# Patient Record
Sex: MALE | Race: WHITE | URBAN - METROPOLITAN AREA
[De-identification: names, ages, dates, MRNs, and addresses within clinical notes are randomized per-mention and may not be internally consistent; named-entity substitution may affect disease eponyms.]

---

## 2017-11-09 ENCOUNTER — APPOINTMENT (RX ONLY)
Dept: URBAN - METROPOLITAN AREA CLINIC 116 | Facility: CLINIC | Age: 57
Setting detail: DERMATOLOGY
End: 2017-11-09

## 2017-11-09 DIAGNOSIS — L73.9 FOLLICULAR DISORDER, UNSPECIFIED: ICD-10-CM

## 2017-11-09 DIAGNOSIS — L91.8 OTHER HYPERTROPHIC DISORDERS OF THE SKIN: ICD-10-CM

## 2017-11-09 DIAGNOSIS — L663 OTHER SPECIFIED DISEASES OF HAIR AND HAIR FOLLICLES: ICD-10-CM

## 2017-11-09 DIAGNOSIS — L738 OTHER SPECIFIED DISEASES OF HAIR AND HAIR FOLLICLES: ICD-10-CM

## 2017-11-09 PROBLEM — L02.222 FURUNCLE OF BACK [ANY PART, EXCEPT BUTTOCK]: Status: ACTIVE | Noted: 2017-11-09

## 2017-11-09 PROCEDURE — ? REFUSAL OF TREATMENT

## 2017-11-09 PROCEDURE — 11201 RMVL SKIN TAGS EA ADDL 10: CPT

## 2017-11-09 PROCEDURE — 11200 RMVL SKIN TAGS UP TO&INC 15: CPT

## 2017-11-09 PROCEDURE — ? COUNSELING

## 2017-11-09 PROCEDURE — 99214 OFFICE O/P EST MOD 30 MIN: CPT | Mod: 25

## 2017-11-09 PROCEDURE — ? SKIN TAG REMOVAL

## 2017-11-09 ASSESSMENT — LOCATION SIMPLE DESCRIPTION DERM
LOCATION SIMPLE: LEFT AXILLARY VAULT
LOCATION SIMPLE: RIGHT UPPER BACK
LOCATION SIMPLE: RIGHT AXILLARY VAULT

## 2017-11-09 ASSESSMENT — LOCATION ZONE DERM
LOCATION ZONE: TRUNK
LOCATION ZONE: AXILLAE

## 2017-11-09 ASSESSMENT — LOCATION DETAILED DESCRIPTION DERM
LOCATION DETAILED: RIGHT AXILLARY VAULT
LOCATION DETAILED: RIGHT MID-UPPER BACK
LOCATION DETAILED: LEFT AXILLARY VAULT

## 2017-11-09 NOTE — PROCEDURE: SKIN TAG REMOVAL
Detail Level: Detailed
Hemostasis: Aluminum Chloride
Consent: Written consent obtained and the risks of skin tag removal was reviewed with the patient including but not limited to bleeding, pigmentary change, infection, pain, and remote possibility of scarring.
Anesthesia Volume In Cc: 3
Medical Necessity Clause: This procedure was medically necessary because the lesions that were treated were:
Include Z78.9 (Other Specified Conditions Influencing Health Status) As An Associated Diagnosis?: Yes
Medical Necessity Information: It is in your best interest to select a reason for this procedure from the list below. All of these items fulfill various CMS LCD requirements except the new and changing color options.

## 2021-10-01 ENCOUNTER — OFFICE VISIT (OUTPATIENT)
Age: 61
End: 2021-10-01

## 2021-11-01 ENCOUNTER — OFFICE VISIT (OUTPATIENT)
Age: 61
End: 2021-11-01

## 2021-11-16 ENCOUNTER — OFFICE VISIT (OUTPATIENT)
Dept: URBAN - METROPOLITAN AREA CLINIC 7 | Facility: CLINIC | Age: 61
End: 2021-11-16

## 2021-11-17 ENCOUNTER — OFFICE VISIT (OUTPATIENT)
Dept: URBAN - METROPOLITAN AREA CLINIC 7 | Facility: CLINIC | Age: 61
End: 2021-11-17

## 2021-12-08 ENCOUNTER — OFFICE VISIT (OUTPATIENT)
Dept: URBAN - METROPOLITAN AREA SURGERY CENTER 5 | Facility: SURGERY CENTER | Age: 61
End: 2021-12-08

## 2021-12-17 ENCOUNTER — OFFICE VISIT (OUTPATIENT)
Dept: URBAN - METROPOLITAN AREA CLINIC 7 | Facility: CLINIC | Age: 61
End: 2021-12-17

## 2022-01-14 ENCOUNTER — OFFICE VISIT (OUTPATIENT)
Dept: URBAN - METROPOLITAN AREA CLINIC 7 | Facility: CLINIC | Age: 62
End: 2022-01-14

## 2022-07-30 ENCOUNTER — TELEPHONE ENCOUNTER (OUTPATIENT)
Age: 62
End: 2022-07-30

## 2022-07-31 ENCOUNTER — TELEPHONE ENCOUNTER (OUTPATIENT)
Age: 62
End: 2022-07-31

## 2023-11-02 ENCOUNTER — OFFICE VISIT (OUTPATIENT)
Dept: URBAN - METROPOLITAN AREA CLINIC 7 | Facility: CLINIC | Age: 63
End: 2023-11-02
Payer: MEDICARE

## 2023-11-02 ENCOUNTER — DASHBOARD ENCOUNTERS (OUTPATIENT)
Age: 63
End: 2023-11-02

## 2023-11-02 VITALS — HEIGHT: 65 IN | TEMPERATURE: 97.8 F | WEIGHT: 246.2 LBS | BODY MASS INDEX: 41.02 KG/M2

## 2023-11-02 DIAGNOSIS — R15.1 FECAL SMEARING: ICD-10-CM

## 2023-11-02 PROCEDURE — 99214 OFFICE O/P EST MOD 30 MIN: CPT | Performed by: STUDENT IN AN ORGANIZED HEALTH CARE EDUCATION/TRAINING PROGRAM

## 2023-11-02 RX ORDER — PSYLLIUM SEED (WITH DEXTROSE)
1 PACKET WITH 8 OUNCES OF LIQUID AS NEEDED POWDER (GRAM) ORAL TWICE A DAY
Qty: 60 | Refills: 0 | OUTPATIENT
Start: 2023-11-06 | End: 2023-12-06

## 2023-11-02 RX ORDER — AMOXICILLIN 500 MG/1
TAKE 1 CAPSULE BY MOUTH 3 TIMES A DAY FOR 5 DAYS CAPSULE ORAL
Qty: 15 EACH | Refills: 0 | Status: ACTIVE | COMMUNITY

## 2023-11-02 RX ORDER — HYDROCHLOROTHIAZIDE 25 MG/1
TABLET ORAL
Qty: 45 TABLET | Status: ACTIVE | COMMUNITY

## 2023-11-02 RX ORDER — IBUPROFEN 800 MG/1
TAKE 1 TABLET BY MOUTH THREE TIMES A DAY WITH FOOD TABLET, FILM COATED ORAL
Qty: 18 EACH | Refills: 0 | Status: ACTIVE | COMMUNITY

## 2023-11-02 RX ORDER — HYDROCODONE BITARTRATE AND ACETAMINOPHEN 5; 325 MG/1; MG/1
TAKE 1 TABLET BY MOUTH EVERY 4 TO 6 HOURS AS NEEDED FOR PAIN TABLET ORAL
Qty: 18 EACH | Refills: 0 | Status: ACTIVE | COMMUNITY

## 2023-11-02 RX ORDER — PREDNISONE 20 MG/1
TABLET ORAL
Qty: 10 TABLET | Status: ACTIVE | COMMUNITY